# Patient Record
Sex: FEMALE | Race: WHITE | NOT HISPANIC OR LATINO | Employment: FULL TIME | URBAN - METROPOLITAN AREA
[De-identification: names, ages, dates, MRNs, and addresses within clinical notes are randomized per-mention and may not be internally consistent; named-entity substitution may affect disease eponyms.]

---

## 2024-02-12 ENCOUNTER — APPOINTMENT (OUTPATIENT)
Dept: PRIMARY CARE | Facility: CLINIC | Age: 34
End: 2024-02-12
Payer: COMMERCIAL

## 2024-05-13 ENCOUNTER — OFFICE VISIT (OUTPATIENT)
Dept: PRIMARY CARE | Facility: CLINIC | Age: 34
End: 2024-05-13
Payer: COMMERCIAL

## 2024-05-13 ENCOUNTER — LAB (OUTPATIENT)
Dept: LAB | Facility: LAB | Age: 34
End: 2024-05-13
Payer: COMMERCIAL

## 2024-05-13 VITALS
BODY MASS INDEX: 39.4 KG/M2 | TEMPERATURE: 97.5 F | WEIGHT: 260 LBS | DIASTOLIC BLOOD PRESSURE: 107 MMHG | HEART RATE: 84 BPM | SYSTOLIC BLOOD PRESSURE: 160 MMHG | HEIGHT: 68 IN | OXYGEN SATURATION: 98 %

## 2024-05-13 DIAGNOSIS — R79.89 ABNORMAL TSH: ICD-10-CM

## 2024-05-13 DIAGNOSIS — Z00.00 ANNUAL PHYSICAL EXAM: Primary | ICD-10-CM

## 2024-05-13 DIAGNOSIS — Z00.00 ANNUAL PHYSICAL EXAM: ICD-10-CM

## 2024-05-13 DIAGNOSIS — E66.01 CLASS 2 SEVERE OBESITY WITH SERIOUS COMORBIDITY AND BODY MASS INDEX (BMI) OF 39.0 TO 39.9 IN ADULT, UNSPECIFIED OBESITY TYPE (MULTI): ICD-10-CM

## 2024-05-13 DIAGNOSIS — I10 PRIMARY HYPERTENSION: ICD-10-CM

## 2024-05-13 PROBLEM — N92.0 MENORRHAGIA: Status: ACTIVE | Noted: 2024-05-13

## 2024-05-13 PROBLEM — G62.9 PERIPHERAL NEURITIS: Status: RESOLVED | Noted: 2024-05-13 | Resolved: 2024-05-13

## 2024-05-13 PROBLEM — M79.89 SOFT TISSUE MASS: Status: RESOLVED | Noted: 2024-05-13 | Resolved: 2024-05-13

## 2024-05-13 PROBLEM — R05.9 COUGH: Status: RESOLVED | Noted: 2024-05-13 | Resolved: 2024-05-13

## 2024-05-13 PROBLEM — R60.9 PERIPHERAL EDEMA: Status: ACTIVE | Noted: 2024-05-13

## 2024-05-13 PROBLEM — R60.0 PERIPHERAL EDEMA: Status: ACTIVE | Noted: 2024-05-13

## 2024-05-13 PROBLEM — F41.8 DEPRESSION WITH ANXIETY: Status: RESOLVED | Noted: 2024-05-13 | Resolved: 2024-05-13

## 2024-05-13 PROBLEM — T14.8XXA FRACTURE OF BONE: Status: RESOLVED | Noted: 2024-05-13 | Resolved: 2024-05-13

## 2024-05-13 PROBLEM — J01.90 ACUTE BACTERIAL SINUSITIS: Status: RESOLVED | Noted: 2024-05-13 | Resolved: 2024-05-13

## 2024-05-13 PROBLEM — L98.9 SKIN LESIONS: Status: RESOLVED | Noted: 2024-05-13 | Resolved: 2024-05-13

## 2024-05-13 PROBLEM — J06.9 VIRAL UPPER RESPIRATORY TRACT INFECTION: Status: RESOLVED | Noted: 2024-05-13 | Resolved: 2024-05-13

## 2024-05-13 PROBLEM — B96.89 ACUTE BACTERIAL SINUSITIS: Status: RESOLVED | Noted: 2024-05-13 | Resolved: 2024-05-13

## 2024-05-13 PROBLEM — F32.0 MILD MAJOR DEPRESSION (CMS-HCC): Status: ACTIVE | Noted: 2024-05-13

## 2024-05-13 PROBLEM — K58.9 IBS (IRRITABLE BOWEL SYNDROME): Status: ACTIVE | Noted: 2024-05-13

## 2024-05-13 PROBLEM — H93.A2 SUBJECTIVE PULSATILE TINNITUS OF LEFT EAR: Status: RESOLVED | Noted: 2024-05-13 | Resolved: 2024-05-13

## 2024-05-13 PROBLEM — R20.2 PARESTHESIA: Status: ACTIVE | Noted: 2024-05-13

## 2024-05-13 PROBLEM — D64.9 ANEMIA: Status: ACTIVE | Noted: 2024-05-13

## 2024-05-13 PROBLEM — D27.0 DERMOID CYST OF RIGHT OVARY: Status: RESOLVED | Noted: 2017-04-07 | Resolved: 2024-05-13

## 2024-05-13 PROBLEM — E66.9 OBESITY WITH BODY MASS INDEX 30 OR GREATER: Status: RESOLVED | Noted: 2024-05-13 | Resolved: 2024-05-13

## 2024-05-13 LAB
ALBUMIN SERPL BCP-MCNC: 4.3 G/DL (ref 3.4–5)
ALP SERPL-CCNC: 52 U/L (ref 33–110)
ALT SERPL W P-5'-P-CCNC: 11 U/L (ref 7–45)
ANION GAP SERPL CALC-SCNC: 15 MMOL/L (ref 10–20)
AST SERPL W P-5'-P-CCNC: 11 U/L (ref 9–39)
BILIRUB SERPL-MCNC: 0.3 MG/DL (ref 0–1.2)
BUN SERPL-MCNC: 10 MG/DL (ref 6–23)
CALCIUM SERPL-MCNC: 9.3 MG/DL (ref 8.6–10.6)
CHLORIDE SERPL-SCNC: 105 MMOL/L (ref 98–107)
CHOLEST SERPL-MCNC: 168 MG/DL (ref 0–199)
CHOLESTEROL/HDL RATIO: 2.7
CO2 SERPL-SCNC: 24 MMOL/L (ref 21–32)
CREAT SERPL-MCNC: 0.68 MG/DL (ref 0.5–1.05)
EGFRCR SERPLBLD CKD-EPI 2021: >90 ML/MIN/1.73M*2
ERYTHROCYTE [DISTWIDTH] IN BLOOD BY AUTOMATED COUNT: 12.9 % (ref 11.5–14.5)
EST. AVERAGE GLUCOSE BLD GHB EST-MCNC: 97 MG/DL
GLUCOSE SERPL-MCNC: 89 MG/DL (ref 74–99)
HBA1C MFR BLD: 5 %
HCT VFR BLD AUTO: 41.7 % (ref 36–46)
HCV AB SER QL: NONREACTIVE
HDLC SERPL-MCNC: 62 MG/DL
HGB BLD-MCNC: 14 G/DL (ref 12–16)
LDLC SERPL CALC-MCNC: 80 MG/DL
MCH RBC QN AUTO: 30.2 PG (ref 26–34)
MCHC RBC AUTO-ENTMCNC: 33.6 G/DL (ref 32–36)
MCV RBC AUTO: 90 FL (ref 80–100)
NON HDL CHOLESTEROL: 106 MG/DL (ref 0–149)
NRBC BLD-RTO: 0 /100 WBCS (ref 0–0)
PLATELET # BLD AUTO: 293 X10*3/UL (ref 150–450)
POTASSIUM SERPL-SCNC: 4 MMOL/L (ref 3.5–5.3)
PROT SERPL-MCNC: 7.4 G/DL (ref 6.4–8.2)
RBC # BLD AUTO: 4.64 X10*6/UL (ref 4–5.2)
SODIUM SERPL-SCNC: 140 MMOL/L (ref 136–145)
TRIGL SERPL-MCNC: 130 MG/DL (ref 0–149)
TSH SERPL-ACNC: 4.32 MIU/L (ref 0.44–3.98)
VLDL: 26 MG/DL (ref 0–40)
WBC # BLD AUTO: 11 X10*3/UL (ref 4.4–11.3)

## 2024-05-13 PROCEDURE — 86803 HEPATITIS C AB TEST: CPT

## 2024-05-13 PROCEDURE — 85027 COMPLETE CBC AUTOMATED: CPT

## 2024-05-13 PROCEDURE — 3077F SYST BP >= 140 MM HG: CPT | Performed by: FAMILY MEDICINE

## 2024-05-13 PROCEDURE — 80053 COMPREHEN METABOLIC PANEL: CPT

## 2024-05-13 PROCEDURE — 80061 LIPID PANEL: CPT

## 2024-05-13 PROCEDURE — 3008F BODY MASS INDEX DOCD: CPT | Performed by: FAMILY MEDICINE

## 2024-05-13 PROCEDURE — 84443 ASSAY THYROID STIM HORMONE: CPT

## 2024-05-13 PROCEDURE — 83036 HEMOGLOBIN GLYCOSYLATED A1C: CPT

## 2024-05-13 PROCEDURE — 99212 OFFICE O/P EST SF 10 MIN: CPT | Performed by: FAMILY MEDICINE

## 2024-05-13 PROCEDURE — 99395 PREV VISIT EST AGE 18-39: CPT | Performed by: FAMILY MEDICINE

## 2024-05-13 PROCEDURE — 36415 COLL VENOUS BLD VENIPUNCTURE: CPT

## 2024-05-13 PROCEDURE — 3080F DIAST BP >= 90 MM HG: CPT | Performed by: FAMILY MEDICINE

## 2024-05-13 RX ORDER — NORETHINDRONE ACETATE AND ETHINYL ESTRADIOL 1MG-20(21)
1 KIT ORAL DAILY
COMMUNITY
Start: 2020-02-05

## 2024-05-13 RX ORDER — METOPROLOL SUCCINATE 25 MG/1
25 TABLET, EXTENDED RELEASE ORAL DAILY
Qty: 90 TABLET | Refills: 3 | Status: SHIPPED | OUTPATIENT
Start: 2024-05-13 | End: 2025-05-13

## 2024-05-13 ASSESSMENT — PATIENT HEALTH QUESTIONNAIRE - PHQ9
SUM OF ALL RESPONSES TO PHQ9 QUESTIONS 1 AND 2: 2
2. FEELING DOWN, DEPRESSED OR HOPELESS: SEVERAL DAYS
1. LITTLE INTEREST OR PLEASURE IN DOING THINGS: SEVERAL DAYS

## 2024-05-13 NOTE — PROGRESS NOTES
"Subjective   Patient ID: Mima Alejandre \"Aye\" is a 34 y.o. female who presents for Follow-up (Numb, tingle in legs and arms ) and Annual Exam.  Very pleasant 34-year-old here today with persistent paresthesias in her arms and legs feels tingling constantly and also to have an overall health evaluation because she is overdue she has had some continued fatigue paresthesias has been told in the past that her thyroid was abnormal feels some pressure in the front of her neck and she is also found to be profoundly hypertensive and she does have a family history of hypertension she denies angina palpitations or syncope has the tingling comes and goes and been evaluated in the past she has had this for several years she is not a smoker and has not had any recent hospital or emergency room visits does have a slight prominence of her thyroid gland and has been told in the past that her TSH is was abnormal and she is also been having some paresthesias    Hypertension  This is a new problem. The current episode started today. The problem is uncontrolled. Associated symptoms include malaise/fatigue. Pertinent negatives include no anxiety, blurred vision, chest pain, headaches, neck pain, orthopnea, palpitations, peripheral edema, PND, shortness of breath or sweats. Agents associated with hypertension include oral contraceptives. Risk factors for coronary artery disease include family history and obesity. Past treatments include lifestyle changes. The current treatment provides no improvement. There are no compliance problems.  There is no history of angina, kidney disease, CAD/MI, CVA, heart failure, left ventricular hypertrophy, PVD or retinopathy. Identifiable causes of hypertension include a hypertension causing med. There is no history of chronic renal disease, coarctation of the aorta, hyperaldosteronism, hypercortisolism, hyperparathyroidism, pheochromocytoma, renovascular disease, sleep apnea or a thyroid problem. " "      Review of Systems   Constitutional:  Positive for malaise/fatigue.   Eyes:  Negative for blurred vision.   Respiratory:  Negative for shortness of breath.    Cardiovascular:  Negative for chest pain, palpitations, orthopnea and PND.   Musculoskeletal:  Negative for neck pain.   Neurological:  Negative for headaches.     Constitutional: no chills, no fever and no night sweats.   Eyes: no blurred vision and no eyesight problems.   ENT: no hearing loss, no nasal congestion, no nasal discharge, no hoarseness and no sore throat.   Cardiovascular: no chest pain, no intermittent leg claudication, no lower extremity edema, no palpitations and no syncope.   Respiratory: no cough, no shortness of breath during exertion, no shortness of breath at rest and no wheezing.   Gastrointestinal: no abdominal pain, no blood in stools, no constipation, no diarrhea, no melena, no nausea, no rectal pain and no vomiting.   Genitourinary: no dysuria, no change in urinary frequency, no urinary hesitancy, no feelings of urinary urgency and no vaginal discharge.   Musculoskeletal: no arthralgias,  no back pain and no myalgias.   Integumentary: no new skin lesions and no rashes.   Neurological: no difficulty walking, no headache, no limb weakness, no numbness and no tingling.   Psychiatric: no anxiety, no depression, no anhedonia and no substance use disorders.   Endocrine: no recent weight gain and no recent weight loss.   Hematologic/Lymphatic: no tendency for easy bruising and no swollen glands .    Objective    BP (!) 160/107   Pulse 84   Temp 36.4 °C (97.5 °F)   Ht 1.727 m (5' 8\")   Wt 118 kg (260 lb)   SpO2 98%   BMI 39.53 kg/m²    Physical Exam  The patient appeared well nourished and normally developed. Vital signs as documented. Head exam is unremarkable. No scleral icterus or corneal arcus noted.  Pupils are equal round reactive to light extraocular movements are intact no hemorrhages noted on funduscopic exam mouth mucous " membranes are moist no exudates ears canals clear TMs are gray pearly not injected nose no rhinorrhea or epistaxis Neck is without jugular venous distension, thyromegaly, or carotid bruits. Carotid upstrokes are brisk bilaterally. Lungs are clear to auscultation and percussion. Cardiac exam reveals the PMI to be normally sized and situated. Rhythm is regular. First and second heart sounds normal. No murmurs, rubs or gallops. Abdominal exam reveals normal bowel sounds, no masses, no organomegaly and no aortic enlargement. Extremities are nonedematous and both femoral and pedal pulses are normal.  Neurologic exam DTRs are equal bilaterally no focal deficits strength is symmetrical heme lymph no palpable lymph nodes in the neck axilla or groin    Assessment/Plan   Problem List Items Addressed This Visit       Annual physical exam - Primary     Physical exam remarkable for hypertension otherwise unremarkable exam  Above normal BMI nutrition and physical activity reviewed  Biometric screening  Continue annual exams         Relevant Orders    Comprehensive Metabolic Panel (Completed)    Lipid Panel (Completed)    Hepatitis C antibody (Completed)    CBC (Completed)    Hemoglobin A1C (Completed)    TSH (Completed)    Primary hypertension     Newly diagnosed hypertension  Begin metoprolol  Recheck blood pressure in 2 weeks         Relevant Medications    metoprolol succinate XL (Toprol-XL) 25 mg 24 hr tablet    Abnormal TSH    Relevant Orders    US thyroid (Completed)    Class 2 severe obesity with serious comorbidity and body mass index (BMI) of 39.0 to 39.9 in adult (Multi)     Above normal BMI nutrition and physical activity reviewed  Recommend weight watchers or [employer]  Also recommend checking TSH        Hypertension most likely primary could be contributing to the oral contraceptives and family history as well as body habitus  Recommending addressing the obesity starting on metoprolol discontinuing oral contraceptives  recheck blood pressure in 2 weeks  History of abnormal thyroid  Check TSH  Will also order thyroid ultrasound to evaluate patient is complaining of chronic paresthesias with a normal neurologic exam could be related to the thyroid         Rosaura Glass MD

## 2024-05-14 ENCOUNTER — TELEPHONE (OUTPATIENT)
Dept: PRIMARY CARE | Facility: CLINIC | Age: 34
End: 2024-05-14
Payer: COMMERCIAL

## 2024-05-14 NOTE — TELEPHONE ENCOUNTER
Pt came in to  an order for an ultrasound of her thyroid, but it was not in her chart. She has requested for us to print it so kay can pick it up.    Pt phone:  [phone]

## 2024-05-17 ENCOUNTER — HOSPITAL ENCOUNTER (OUTPATIENT)
Dept: RADIOLOGY | Facility: CLINIC | Age: 34
Discharge: HOME | End: 2024-05-17
Payer: COMMERCIAL

## 2024-05-17 DIAGNOSIS — R79.89 ABNORMAL TSH: ICD-10-CM

## 2024-05-17 PROCEDURE — 76536 US EXAM OF HEAD AND NECK: CPT | Performed by: RADIOLOGY

## 2024-05-17 PROCEDURE — 76536 US EXAM OF HEAD AND NECK: CPT

## 2024-05-23 DIAGNOSIS — E03.9 ACQUIRED HYPOTHYROIDISM: Primary | ICD-10-CM

## 2024-05-23 PROBLEM — Z00.00 ANNUAL PHYSICAL EXAM: Status: ACTIVE | Noted: 2024-05-23

## 2024-05-23 PROBLEM — R79.89 ABNORMAL TSH: Status: ACTIVE | Noted: 2024-05-23

## 2024-05-23 PROBLEM — E66.812 CLASS 2 SEVERE OBESITY WITH SERIOUS COMORBIDITY AND BODY MASS INDEX (BMI) OF 39.0 TO 39.9 IN ADULT: Status: ACTIVE | Noted: 2024-05-23

## 2024-05-23 PROBLEM — I10 PRIMARY HYPERTENSION: Status: ACTIVE | Noted: 2024-05-23

## 2024-05-23 PROBLEM — E66.01 CLASS 2 SEVERE OBESITY WITH SERIOUS COMORBIDITY AND BODY MASS INDEX (BMI) OF 39.0 TO 39.9 IN ADULT (MULTI): Status: ACTIVE | Noted: 2024-05-23

## 2024-05-23 PROBLEM — S22.20XA FRACTURE OF STERNUM: Status: RESOLVED | Noted: 2024-05-23 | Resolved: 2024-05-23

## 2024-05-23 RX ORDER — LEVOTHYROXINE SODIUM 25 UG/1
25 TABLET ORAL DAILY
Qty: 30 TABLET | Refills: 11 | Status: SHIPPED | OUTPATIENT
Start: 2024-05-23 | End: 2025-05-23

## 2024-05-23 ASSESSMENT — ENCOUNTER SYMPTOMS
SHORTNESS OF BREATH: 0
BLURRED VISION: 0
PALPITATIONS: 0
HYPERTENSION: 1
ORTHOPNEA: 0
PND: 0
HEADACHES: 0
SWEATS: 0
NECK PAIN: 0

## 2024-05-23 NOTE — ASSESSMENT & PLAN NOTE
Physical exam remarkable for hypertension otherwise unremarkable exam  Above normal BMI nutrition and physical activity reviewed  Biometric screening  Continue annual exams

## 2024-05-23 NOTE — ASSESSMENT & PLAN NOTE
Above normal BMI nutrition and physical activity reviewed  Recommend weight watchers or No[employer]  Also recommend checking TSH

## 2024-05-23 NOTE — PATIENT INSTRUCTIONS
Today you were seen for an annual wellness evaluation as well and diagnosed with hypertension please begin the medication and follow-up in 2 weeks to repeat the blood pressure  Please have your blood work done and your thyroid evaluated as soon as possible  It is recommended that you have an annual wellness exam at least once a year

## 2024-06-12 ENCOUNTER — OFFICE VISIT (OUTPATIENT)
Dept: PRIMARY CARE | Facility: CLINIC | Age: 34
End: 2024-06-12
Payer: COMMERCIAL

## 2024-06-12 VITALS
DIASTOLIC BLOOD PRESSURE: 96 MMHG | BODY MASS INDEX: 38.89 KG/M2 | WEIGHT: 256.6 LBS | OXYGEN SATURATION: 97 % | HEIGHT: 68 IN | SYSTOLIC BLOOD PRESSURE: 147 MMHG | HEART RATE: 93 BPM | TEMPERATURE: 98 F

## 2024-06-12 DIAGNOSIS — F32.0 MILD MAJOR DEPRESSION (CMS-HCC): Primary | ICD-10-CM

## 2024-06-12 DIAGNOSIS — I10 PRIMARY HYPERTENSION: ICD-10-CM

## 2024-06-12 DIAGNOSIS — E03.9 ACQUIRED HYPOTHYROIDISM: ICD-10-CM

## 2024-06-12 LAB — NON-UH HIE TSH: 1.35 UIU/ML (ref 0.55–4.78)

## 2024-06-12 PROCEDURE — 3080F DIAST BP >= 90 MM HG: CPT | Performed by: FAMILY MEDICINE

## 2024-06-12 PROCEDURE — 99214 OFFICE O/P EST MOD 30 MIN: CPT | Performed by: FAMILY MEDICINE

## 2024-06-12 PROCEDURE — 3008F BODY MASS INDEX DOCD: CPT | Performed by: FAMILY MEDICINE

## 2024-06-12 PROCEDURE — 3077F SYST BP >= 140 MM HG: CPT | Performed by: FAMILY MEDICINE

## 2024-06-12 RX ORDER — BUPROPION HYDROCHLORIDE 150 MG/1
150 TABLET ORAL EVERY MORNING
Qty: 90 TABLET | Refills: 3 | Status: SHIPPED | OUTPATIENT
Start: 2024-06-12 | End: 2025-06-12

## 2024-06-12 ASSESSMENT — PATIENT HEALTH QUESTIONNAIRE - PHQ9
2. FEELING DOWN, DEPRESSED OR HOPELESS: SEVERAL DAYS
1. LITTLE INTEREST OR PLEASURE IN DOING THINGS: NOT AT ALL
SUM OF ALL RESPONSES TO PHQ9 QUESTIONS 1 AND 2: 1

## 2024-06-12 NOTE — PROGRESS NOTES
"  Subjective   Patient ID: Mima Alejandre \"Aye\" is a 34 y.o. female who presents for Follow-up (Feels like she is having a \"skipped\" heart beats for the past couple weeks after starting new BP med. Has not taken med today and has not had any feelings of skipped beats/.).  Very pleasant 34-year-old follow-up multiple issues  Was having problems with palpitations and hypertension started on metoprolol she first noticed some palpitations when she first started and some skipped beats but is now doing better here today for blood pressure check she feels well no headache no blurry double vision no lateralizing weakness but her blood pressure is still not at goal  Also here for follow-up hypothyroidism she had an abnormal TSH started the medication no side effects or issues no tachycardia or tremors  Mild depression with anxiety component  Seeing counselor about things might be time for medication not a danger to herself or others no insomnia no overeating just some perseverative thoughts and fatigue not the best quality of life extensive discussion with patient          Review of Systems  Constitutional: no chills, no fever and no night sweats.   Eyes: no blurred vision and no eyesight problems.   ENT: no hearing loss, no nasal congestion, no nasal discharge, no hoarseness and no sore throat.   Cardiovascular: no chest pain, no intermittent leg claudication, no lower extremity edema, no palpitations and no syncope.   Respiratory: no cough, no shortness of breath during exertion, no shortness of breath at rest and no wheezing.   Gastrointestinal: no abdominal pain, no blood in stools, no constipation, no diarrhea, no melena, no nausea, no rectal pain and no vomiting.   Genitourinary: no dysuria, no change in urinary frequency, no urinary hesitancy, no feelings of urinary urgency and no vaginal discharge.   Musculoskeletal: no arthralgias,  no back pain and no myalgias.   Integumentary: no new skin lesions and no " "rashes.   Neurological: no difficulty walking, no headache, no limb weakness, no numbness and no tingling.   Psychiatric: no anxiety, no depression, no anhedonia and no substance use disorders.   Endocrine: no recent weight gain and no recent weight loss.   Hematologic/Lymphatic: no tendency for easy bruising and no swollen glands .    Objective    BP (!) 147/96   Pulse 93   Temp 36.7 °C (98 °F)   Ht 1.727 m (5' 8\")   Wt 116 kg (256 lb 9.6 oz)   LMP 05/05/2024 Comment: started in feb. went on for a while.  SpO2 97%   BMI 39.02 kg/m²    Physical Exam  The patient appeared well nourished and normally developed. Vital signs as documented. Head exam is unremarkable. No scleral icterus or corneal arcus noted.  Pupils are equal round reactive to light extraocular movements are intact no hemorrhages noted on funduscopic exam mouth mucous membranes are moist no exudates ears canals clear TMs are gray pearly not injected nose no rhinorrhea or epistaxis Neck is without jugular venous distension, thyromegaly, or carotid bruits. Carotid upstrokes are brisk bilaterally. Lungs are clear to auscultation and percussion. Cardiac exam reveals the PMI to be normally sized and situated. Rhythm is regular. First and second heart sounds normal. No murmurs, rubs or gallops. Abdominal exam reveals normal bowel sounds, no masses, no organomegaly and no aortic enlargement. Extremities are nonedematous and both femoral and pedal pulses are normal.  Neurologic exam DTRs are equal bilaterally no focal deficits strength is symmetrical heme lymph no palpable lymph nodes in the neck axilla or groin    Assessment/Plan   Problem List Items Addressed This Visit       Mild major depression (CMS-HCC) - Primary    Relevant Medications    buPROPion XL (Wellbutrin XL) 150 mg 24 hr tablet    Primary hypertension    Relevant Medications    lisinopril 5 mg tablet    Acquired hypothyroidism    Relevant Orders    TSH   Blood pressure is not at goal " continue metoprolol and lisinopril  Mild depression very functional begin Wellbutrin follow-up in 6 to 8 weeks to make sure it is working  Follow-up hypothyroidism tolerating meds well check TSH to make sure is working           Rosaura Glass MD

## 2024-06-26 ENCOUNTER — APPOINTMENT (OUTPATIENT)
Dept: PRIMARY CARE | Facility: CLINIC | Age: 34
End: 2024-06-26
Payer: COMMERCIAL

## 2024-06-29 PROBLEM — R79.89 ABNORMAL TSH: Status: RESOLVED | Noted: 2024-05-23 | Resolved: 2024-06-29

## 2024-06-29 PROBLEM — E03.9 ACQUIRED HYPOTHYROIDISM: Status: ACTIVE | Noted: 2024-06-29

## 2024-06-29 RX ORDER — LISINOPRIL 5 MG/1
5 TABLET ORAL DAILY
Qty: 90 TABLET | Refills: 3 | Status: SHIPPED | OUTPATIENT
Start: 2024-06-29 | End: 2025-06-29

## 2024-07-12 ENCOUNTER — APPOINTMENT (OUTPATIENT)
Dept: PRIMARY CARE | Facility: CLINIC | Age: 34
End: 2024-07-12
Payer: COMMERCIAL

## 2024-09-09 ENCOUNTER — APPOINTMENT (OUTPATIENT)
Dept: PRIMARY CARE | Facility: CLINIC | Age: 34
End: 2024-09-09
Payer: COMMERCIAL

## 2024-09-09 VITALS
TEMPERATURE: 97.3 F | SYSTOLIC BLOOD PRESSURE: 130 MMHG | WEIGHT: 262 LBS | BODY MASS INDEX: 39.84 KG/M2 | HEART RATE: 65 BPM | OXYGEN SATURATION: 93 % | DIASTOLIC BLOOD PRESSURE: 78 MMHG

## 2024-09-09 DIAGNOSIS — E03.9 ACQUIRED HYPOTHYROIDISM: ICD-10-CM

## 2024-09-09 DIAGNOSIS — F32.0 MILD MAJOR DEPRESSION (CMS-HCC): ICD-10-CM

## 2024-09-09 DIAGNOSIS — I10 PRIMARY HYPERTENSION: ICD-10-CM

## 2024-09-09 PROCEDURE — 3078F DIAST BP <80 MM HG: CPT | Performed by: FAMILY MEDICINE

## 2024-09-09 PROCEDURE — 99213 OFFICE O/P EST LOW 20 MIN: CPT | Performed by: FAMILY MEDICINE

## 2024-09-09 PROCEDURE — 3075F SYST BP GE 130 - 139MM HG: CPT | Performed by: FAMILY MEDICINE

## 2024-09-09 RX ORDER — LEVOTHYROXINE SODIUM 25 UG/1
25 TABLET ORAL DAILY
Qty: 90 TABLET | Refills: 3 | Status: SHIPPED | OUTPATIENT
Start: 2024-09-09 | End: 2025-09-09

## 2024-09-09 RX ORDER — METOPROLOL SUCCINATE 25 MG/1
25 TABLET, EXTENDED RELEASE ORAL DAILY
Qty: 90 TABLET | Refills: 3 | Status: SHIPPED | OUTPATIENT
Start: 2024-09-09 | End: 2025-09-09

## 2024-09-09 RX ORDER — LISINOPRIL 5 MG/1
5 TABLET ORAL DAILY
Qty: 90 TABLET | Refills: 3 | Status: SHIPPED | OUTPATIENT
Start: 2024-09-09 | End: 2025-09-09

## 2024-09-09 RX ORDER — BUPROPION HYDROCHLORIDE 150 MG/1
150 TABLET ORAL EVERY MORNING
Qty: 90 TABLET | Refills: 3 | Status: SHIPPED | OUTPATIENT
Start: 2024-09-09 | End: 2025-09-09

## 2024-09-09 ASSESSMENT — PATIENT HEALTH QUESTIONNAIRE - PHQ9
2. FEELING DOWN, DEPRESSED OR HOPELESS: NOT AT ALL
SUM OF ALL RESPONSES TO PHQ9 QUESTIONS 1 AND 2: 0
1. LITTLE INTEREST OR PLEASURE IN DOING THINGS: NOT AT ALL

## 2024-09-09 NOTE — PROGRESS NOTES
"Subjective   Patient ID: Mima Alejandre \"Aye\" is a 34 y.o. female who presents for Follow-up (Bp check, and birth cont. ).  Very pleasant 34-year-old follow-up hypertension started on lisinopril and metoprolol blood pressure is now at goal she is tolerating the medicines well with no side effects or issues no recent hospital or emergency room visits no angina palpitations or syncope no lateralizing weakness no visual issues blood pressure reading is good and she feels well  Mild depression had it in the past feeling slightly anxious not a danger to herself or others but enough that she is feeling a bit unsettled and starting to affect daily life is asking for medication extensive discussion with patient mild sleep disruption perseverative thoughts and anxiety  Follow-up hypothyroidism TSH was abnormal in summer started on the newer dosage last TSH was normal she is tolerating with no side effects or issues and feels well        Review of Systems  Constitutional: no chills, no fever and no night sweats.   Eyes: no blurred vision and no eyesight problems.   ENT: no hearing loss, no nasal congestion, no nasal discharge, no hoarseness and no sore throat.   Cardiovascular: no chest pain, no intermittent leg claudication, no lower extremity edema, no palpitations and no syncope.   Respiratory: no cough, no shortness of breath during exertion, no shortness of breath at rest and no wheezing.   Gastrointestinal: no abdominal pain, no blood in stools, no constipation, no diarrhea, no melena, no nausea, no rectal pain and no vomiting.   Genitourinary: no dysuria, no change in urinary frequency, no urinary hesitancy, no feelings of urinary urgency and no vaginal discharge.   Musculoskeletal: no arthralgias,  no back pain and no myalgias.   Integumentary: no new skin lesions and no rashes.   Neurological: no difficulty walking, no headache, no limb weakness, no numbness and no tingling.   Psychiatric: no anxiety, no " depression, no anhedonia and no substance use disorders.   Endocrine: no recent weight gain and no recent weight loss.   Hematologic/Lymphatic: no tendency for easy bruising and no swollen glands .    Objective    /78   Pulse 65   Temp 36.3 °C (97.3 °F)   Wt 119 kg (262 lb)   SpO2 93%   BMI 39.84 kg/m²    Physical Exam  The patient appeared well nourished and normally developed. Vital signs as documented. Head exam is unremarkable. No scleral icterus or corneal arcus noted.  Pupils are equal round reactive to light extraocular movements are intact no hemorrhages noted on funduscopic exam mouth mucous membranes are moist no exudates ears canals clear TMs are gray pearly not injected nose no rhinorrhea or epistaxis Neck is without jugular venous distension, thyromegaly, or carotid bruits. Carotid upstrokes are brisk bilaterally. Lungs are clear to auscultation and percussion. Cardiac exam reveals the PMI to be normally sized and situated. Rhythm is regular. First and second heart sounds normal. No murmurs, rubs or gallops. Abdominal exam reveals normal bowel sounds, no masses, no organomegaly and no aortic enlargement. Extremities are nonedematous and both femoral and pedal pulses are normal.  Neurologic exam DTRs are equal bilaterally no focal deficits strength is symmetrical heme lymph no palpable lymph nodes in the neck axilla or groin    Assessment/Plan   Problem List Items Addressed This Visit       Mild major depression (CMS-HCC)     Symptoms not currently controlled  Begin Wellbutrin 150  Follow-up in 6 to 8 weeks to make sure the medication is working otherwise 1 year         Relevant Medications    buPROPion XL (Wellbutrin XL) 150 mg 24 hr tablet    Primary hypertension     Stable and controlled on lisinopril and metoprolol  Continue current medication and management  Continue current plan of care         Relevant Medications    lisinopril 5 mg tablet    metoprolol succinate XL (Toprol-XL) 25 mg 24  hr tablet    Acquired hypothyroidism     Stable and controlled on current dosage  Continue current plan of care         Relevant Medications    levothyroxine (Synthroid) 25 mcg tablet            Rosaura Glass MD

## 2024-09-29 NOTE — ASSESSMENT & PLAN NOTE
Stable and controlled on lisinopril and metoprolol  Continue current medication and management  Continue current plan of care

## 2024-09-29 NOTE — ASSESSMENT & PLAN NOTE
Symptoms not currently controlled  Begin Wellbutrin 150  Follow-up in 6 to 8 weeks to make sure the medication is working otherwise 1 year

## 2025-08-23 DIAGNOSIS — E03.9 ACQUIRED HYPOTHYROIDISM: ICD-10-CM

## 2025-08-26 RX ORDER — LEVOTHYROXINE SODIUM 25 UG/1
TABLET ORAL
Qty: 30 TABLET | Refills: 0 | Status: SHIPPED | OUTPATIENT
Start: 2025-08-26